# Patient Record
Sex: FEMALE
[De-identification: names, ages, dates, MRNs, and addresses within clinical notes are randomized per-mention and may not be internally consistent; named-entity substitution may affect disease eponyms.]

---

## 2018-07-09 ENCOUNTER — HOSPITAL ENCOUNTER (EMERGENCY)
Dept: HOSPITAL 25 - UCCORT | Age: 58
Discharge: HOME | End: 2018-07-09
Payer: COMMERCIAL

## 2018-07-09 VITALS — DIASTOLIC BLOOD PRESSURE: 128 MMHG | SYSTOLIC BLOOD PRESSURE: 152 MMHG

## 2018-07-09 DIAGNOSIS — F17.210: ICD-10-CM

## 2018-07-09 DIAGNOSIS — J98.01: Primary | ICD-10-CM

## 2018-07-09 DIAGNOSIS — E07.9: ICD-10-CM

## 2018-07-09 DIAGNOSIS — K21.9: ICD-10-CM

## 2018-07-09 DIAGNOSIS — I10: ICD-10-CM

## 2018-07-09 PROCEDURE — G0463 HOSPITAL OUTPT CLINIC VISIT: HCPCS

## 2018-07-09 PROCEDURE — 99202 OFFICE O/P NEW SF 15 MIN: CPT

## 2018-07-09 NOTE — UC
General HPI





- HPI Summary


HPI Summary: 





Patient is complaining of "bronchitis" for the past week.  She describes as his 

cough, congestion, shortness of breath and wheezing.  She's been using her 

albuterol inhaler or the albuterol nebulizer with partial relief.  She denies 

any fever.  She admits to being a heavy smoker but denies a formal diagnosis of 

asthma or COPD.  She states that when she gets like this she usually requires 

Augmentin to get better.  She has no associated chest pain and denies any 

cardiac disease.





- History of Current Complaint


Chief Complaint: UCRespiratory


Stated Complaint: UPPER RESPIRATORY


Time Seen by Provider: 07/09/18 16:47


Hx Obtained From: Patient


Onset/Duration: Gradual Onset


Timing: Constant


Pain Intensity: 0


Aggravating: HEAT OUTSIDE


Associated Signs & Symptoms: Positive: Cough, SOB, Wheezing.  Negative: Chest 

Pain, Fever





- Allergy/Home Medications


Allergies/Adverse Reactions: 


 Allergies











Allergy/AdvReac Type Severity Reaction Status Date / Time


 


No Known Allergies Allergy   Verified 07/09/18 16:20











Home Medications: 


 Home Medications





Furosemide TAB* [Lasix TAB*] 20 mg PO DAILY 07/09/18 [History Confirmed 07/09/18

]


Levothyroxine TAB* [Synthroid TAB*] 25 mcg PO DAILY 07/09/18 [History Confirmed 

07/09/18]


Lisinopril TAB* [Prinivil TAB*] 40 mg PO DAILY 07/09/18 [History Confirmed 07/09 /18]


Magnesium Oxide [Magnesium] 250 mg PO DAILY 07/09/18 [History Confirmed 07/09/18

]


Metoprolol Succinate 100 mg PO DAILY 07/09/18 [History Confirmed 07/09/18]


Pantoprazole Sodium [Protonix] 40 mg PO DAILY 07/09/18 [History Confirmed 07/09/ 18]


dilTIAZem HCl [Diltiazem 24Hr ER] 300 mg PO DAILY 07/09/18 [History Confirmed 07 /09/18]











PMH/Surg Hx/FS Hx/Imm Hx


Endocrine History: Thyroid Disease


Cardiovascular History: Hypertension


Respiratory History: Bronchitis


GI/ History: Gastroesophageal Reflux





- Surgical History


Surgical History: Yes


Surgery Procedure, Year, and Place: left lymph node removed





- Family History


Known Family History: Positive: Diabetes





- Social History


Occupation: Employed Full-time


Alcohol Use: Occasionally


Substance Use Type: None


Smoking Status (MU): Heavy Every Day Tobacco Smoker





- Immunization History


Vaccination Up to Date: Yes





Review of Systems


Constitutional: Negative


Skin: Negative


Eyes: Negative


ENT: Negative


Respiratory: Shortness Of Breath, Cough


Cardiovascular: Negative


Gastrointestinal: Negative


Genitourinary: Negative


Motor: Negative


Neurovascular: Negative


Musculoskeletal: Negative


Neurological: Negative


Psychological: Negative


Is Patient Immunocompromised?: No


All Other Systems Reviewed And Are Negative: Yes





Physical Exam


Triage Information Reviewed: Yes


Appearance: Well-Appearing


Vital Signs: 


 Initial Vital Signs











Temp  98.3 F   07/09/18 16:14


 


Pulse  55   07/09/18 16:14


 


Resp  16   07/09/18 16:14


 


BP  152/128   07/09/18 16:14


 


Pulse Ox  97   07/09/18 16:14











Vital Signs Reviewed: Yes


Eyes: Positive: Conjunctiva Clear


ENT: Positive: Pharynx normal, TMs normal.  Negative: Nasal congestion, Nasal 

drainage


Neck: Positive: Supple, Nontender, No Lymphadenopathy


Respiratory: Positive: No respiratory distress, Decreased breath sounds, 

Wheezing - occasional, Other: - cough is congested.


Cardiovascular: Positive: RRR, No Murmur


Abdomen Description: Positive: Nontender, No Organomegaly, Soft


Bowel Sounds: Positive: Present


Musculoskeletal: Positive: ROM Intact, No Edema


Neurological: Positive: Alert


Psychological: Positive: Age Appropriate Behavior


Skin Exam: Normal





Course/Dx





- Course


Course Of Treatment: Repeat BP LUE with manual cuff by myself 142/80. Hx htn, 

tx.  Non toxic and not hypoxic. will tx with albuterol Q6H, prednisone and 

augmentin. need for close f/u stressed.





- Differential Dx - Multi-Symptom


Provider Diagnoses: Cough. Bronchospasm





Discharge





- Sign-Out/Discharge


Documenting (check all that apply): Discharge/Admit/Transfer





- Discharge Plan


Condition: Stable


Disposition: HOME


Prescriptions: 


Amoxicillin/Clavulanate TAB* [Augmentin *] 875 mg PO BID #20 tab


predniSONE TAB* [Deltasone 20 MG TAB*] 40 mg PO DAILY 3 Days #6 tab


Patient Education Materials:  Bronchospasm (ED), Acute Cough (ED)


Referrals: 


Ava Aleman PA [Primary Care Provider] - 4 Days


Additional Instructions: 


USE THE ALBUTEROL NEBULIZER OR INHALER EVERY 6 HOURS





- Billing Disposition and Condition


Condition: STABLE


Disposition: Home

## 2018-12-28 ENCOUNTER — HOSPITAL ENCOUNTER (EMERGENCY)
Dept: HOSPITAL 25 - UCCORT | Age: 58
Discharge: HOME | End: 2018-12-28
Payer: COMMERCIAL

## 2018-12-28 VITALS — SYSTOLIC BLOOD PRESSURE: 134 MMHG | DIASTOLIC BLOOD PRESSURE: 66 MMHG

## 2018-12-28 DIAGNOSIS — I10: ICD-10-CM

## 2018-12-28 DIAGNOSIS — J44.9: ICD-10-CM

## 2018-12-28 DIAGNOSIS — J40: ICD-10-CM

## 2018-12-28 DIAGNOSIS — F17.210: ICD-10-CM

## 2018-12-28 DIAGNOSIS — J32.9: Primary | ICD-10-CM

## 2018-12-28 PROCEDURE — 99212 OFFICE O/P EST SF 10 MIN: CPT

## 2018-12-28 PROCEDURE — G0463 HOSPITAL OUTPT CLINIC VISIT: HCPCS

## 2018-12-28 NOTE — UC
Respiratory Complaint HPI





- HPI Summary


HPI Summary: 





Pt c/o cough, sob, nasal congestion ,generalized malaise, sinus pressure and 

pain X 10 days. 





- History of Current Complaint


Chief Complaint: UCRespiratory


Stated Complaint: COUGH CONGESTION


Time Seen by Provider: 12/28/18 11:09


Hx Obtained From: Patient


Pregnant?: No


Onset/Duration: Gradual Onset, Lasting Days, Still Present, Worse Since - onset


Timing: Constant


Severity Initially: Mild


Severity Currently: Moderate


Pain Intensity: 0


Character: Cough: Nonproductive


Aggravating Factors: Exertion, Deep Breaths, Recumbent Position


Associated Signs And Symptoms: Positive: Fever, Chills, Wheezing, URI, Nasal 

Congestion, Sinus Discomfort





- Risk Factors


Pulmonary Embolism Risk Factors: Smoking


Cardiac Risk Factors: Smoking


Pseudomonas Risk Factors: Chronic Lung Disease


Tuberculosis Risk Factors: Smoking





- Allergies/Home Medications


Allergies/Adverse Reactions: 


 Allergies











Allergy/AdvReac Type Severity Reaction Status Date / Time


 


No Known Allergies Allergy   Verified 07/09/18 16:20











Home Medications: 


 Home Medications





Albuterol 2.5MG/3ML (0.083%)* [Ventolin 2.5 MG/3 ML NEB.SOL*] 2.5 mg INH Q6H 

PRN 12/28/18 [History Confirmed 12/28/18]


Albuterol HFA INHALER* [Ventolin HFA Inhaler*] 1 - 2 puff INH Q4H PRN 12/28/18 [

History Confirmed 12/28/18]


Fluticasone NASAL SPRAY 50MCG* [Flonase NASAL SPRAY 50MCG*] 2 spray BOTH NARES 

DAILY PRN 12/28/18 [History Confirmed 12/28/18]


dilTIAZem 360 MG 24HR ER (NF) [Diltiazem 360 mg 24Hr ER] 360 mg PO DAILY 12/28/ 18 [History Confirmed 12/28/18]











PMH/Surg Hx/FS Hx/Imm Hx


Previously Healthy: Yes


Cardiovascular History: Hypertension


Respiratory History: COPD





- Surgical History


Surgical History: Yes


Surgery Procedure, Year, and Place: left lymph node removed





- Family History


Known Family History: Positive: Diabetes





- Social History


Occupation: Employed Full-time


Lives: With Family


Alcohol Use: Occasionally


Substance Use Type: None


Smoking Status (MU): Heavy Every Day Tobacco Smoker


Amount Used/How Often: ~1/2 PPD


Length of Time of Smoking/Using Tobacco: Since Age 21


Have You Smoked in the Last Year: Yes


Household Exposure Type: Cigarettes





- Immunization History


Vaccination Up to Date: Yes





Review of Systems


All Other Systems Reviewed And Are Negative: Yes


Constitutional: Positive: Fever - subjective, Chills, Fatigue


Skin: Positive: Negative


Eyes: Positive: Negative


ENT: Positive: Sinus Congestion, Sinus Pain/Tenderness


Respiratory: Positive: Shortness Of Breath, Cough


Cardiovascular: Positive: Negative


Gastrointestinal: Positive: Negative


Genitourinary: Positive: Negative


Motor: Positive: Negative


Neurovascular: Positive: Negative


Musculoskeletal: Positive: Negative


Neurological: Positive: Headache


Psychological: Positive: Negative


Is Patient Immunocompromised?: No





Physical Exam


Triage Information Reviewed: Yes


Appearance: Ill-Appearing, Thin


Vital Signs: 


 Initial Vital Signs











Temp  98 F   12/28/18 10:21


 


Pulse  78   12/28/18 10:21


 


Resp  18   12/28/18 10:21


 


BP  134/66   12/28/18 10:21


 


Pulse Ox  92   12/28/18 10:21











Vital Signs Reviewed: Yes


Eye Exam: Normal


ENT: Positive: Nasal congestion, Sinus tenderness


Dental Exam: Normal


Neck exam: Normal


Respiratory: Positive: Decreased breath sounds


Cardiovascular Exam: Normal


Musculoskeletal Exam: Normal


Neurological Exam: Normal


Psychological Exam: Normal


Skin Exam: Normal





[facility] Diagnostic Evaluation





- Laboratory


O2 Sat by Pulse Oximetry: 92





Respiratory Course/Dx





- Differential Dx/Diagnosis


Differential Diagnosis/HQI/PQRI: Exacerbation Of COPD, Influenza, Sinusitis


Provider Diagnosis: 


 Sinusitis, Bronchitis








Discharge





- Sign-Out/Discharge


Documenting (check all that apply): Patient Departure


All imaging exams completed and their final reports reviewed: No Studies





- Discharge Plan


Condition: Stable


Disposition: HOME


Prescriptions: 


Amoxicillin/Clavulanate TAB* [Augmentin *] 875 mg PO Q12H #20 tab


Benzonatate CAP* [Tessalon 100 MG CAP*] 200 mg PO Q8H PRN #30 cap


 PRN Reason: Cough


Chlorpheniramine/Dextromethorp [Coricidin Hbp Cough & Cold Tab] 1 each PO Q6H #

20 tablet


predniSONE TAB* [Deltasone 10 MG TAB*] 30 mg PO DAILY #12 tab


Patient Education Materials:  Sinusitis (ED), Acute Bronchitis (ED)


Referrals: 


Ava Aleman PA [Primary Care Provider] - If Needed





- Billing Disposition and Condition


Condition: STABLE


Disposition: Home